# Patient Record
Sex: MALE | Race: BLACK OR AFRICAN AMERICAN | NOT HISPANIC OR LATINO
[De-identification: names, ages, dates, MRNs, and addresses within clinical notes are randomized per-mention and may not be internally consistent; named-entity substitution may affect disease eponyms.]

---
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
## 2023-12-22 PROBLEM — Z00.00 ENCOUNTER FOR PREVENTIVE HEALTH EXAMINATION: Status: ACTIVE | Noted: 2023-12-22

## 2024-01-02 ENCOUNTER — APPOINTMENT (OUTPATIENT)
Dept: ORTHOPEDIC SURGERY | Facility: CLINIC | Age: 19
End: 2024-01-02
Payer: COMMERCIAL

## 2024-01-02 VITALS — WEIGHT: 160 LBS | HEIGHT: 69 IN | BODY MASS INDEX: 23.7 KG/M2

## 2024-01-02 DIAGNOSIS — M70.71 OTHER BURSITIS OF HIP, RIGHT HIP: ICD-10-CM

## 2024-01-02 PROCEDURE — 72100 X-RAY EXAM L-S SPINE 2/3 VWS: CPT

## 2024-01-02 PROCEDURE — 99213 OFFICE O/P EST LOW 20 MIN: CPT

## 2024-01-02 RX ORDER — DICLOFENAC POTASSIUM 50 MG/1
50 TABLET, COATED ORAL TWICE DAILY
Qty: 14 | Refills: 1 | Status: ACTIVE | COMMUNITY
Start: 2024-01-02 | End: 1900-01-01

## 2024-01-02 NOTE — ASSESSMENT
[FreeTextEntry1] : While he does give a history of herniated disc 2 or 3 years back he does not seem to have any radicular symptoms at this point in time and his symptoms are relatively mild due to the fact that he starts lacrosse again in the beginning of February we will do an MRI scan to further evaluate the etiology of his pain and also keep him on he had the Cataflam anti-inflammatory medicine he will return in 1 week after the MRI is performed and consider local cortisone shot for the bursitis in the right hip.

## 2024-01-02 NOTE — HISTORY OF PRESENT ILLNESS
[Lower back] : lower back [7] : 7 [2] : 2 [Intermittent] : intermittent [Heat] : heat [Physical therapy] : physical therapy [Exercising] : exercising [Student] : Work status: student [] : Post Surgical Visit: no [FreeTextEntry1] : hips [FreeTextEntry5] : plays [employer] lacrosse. pain started 4-5 months ago. has done xray,mri, and PT since October. herniated disc 2 years ago. strained lower back muscle 1 year ago. tried chiropractor over the summer which temporarily helpe. [FreeTextEntry6] : numbing, weakness [FreeTextEntry7] : glute [FreeTextEntry9] : dry needling

## 2024-01-02 NOTE — REASON FOR VISIT
[FreeTextEntry2] : NP - lower back and hips Patient comes to office with 3 to 4 months of pain in the lower back and right hip area without any specific injury but secondary to his  in college.  Examination today reveals ambulates with a normal gait straight leg raising is negative DTRs are 2+ bilaterally has some mild tenderness in the lumbar paraspinals more so on the right than the left and also toward the sacroiliac joint on the right side right hip motion is grossly normal no gross swelling but does show some localized trochanteric bursitis.  Neurovascular status grossly intact both lower extremities he can walk on his toes and heels and he can flex and touch his toes.

## 2024-01-02 NOTE — DATA REVIEWED
[FreeTextEntry1] : X-ray of the lumbar spine performed today is consistent with some mild muscular spasm affecting the alignment of the spine.

## 2024-01-04 ENCOUNTER — APPOINTMENT (OUTPATIENT)
Dept: MRI IMAGING | Facility: CLINIC | Age: 19
End: 2024-01-04
Payer: COMMERCIAL

## 2024-01-04 PROCEDURE — 72148 MRI LUMBAR SPINE W/O DYE: CPT

## 2024-01-22 ENCOUNTER — APPOINTMENT (OUTPATIENT)
Dept: ORTHOPEDIC SURGERY | Facility: CLINIC | Age: 19
End: 2024-01-22
Payer: COMMERCIAL

## 2024-01-22 VITALS — WEIGHT: 160 LBS | HEIGHT: 69 IN | BODY MASS INDEX: 23.7 KG/M2

## 2024-01-22 DIAGNOSIS — S39.012A STRAIN OF MUSCLE, FASCIA AND TENDON OF LOWER BACK, INITIAL ENCOUNTER: ICD-10-CM

## 2024-01-22 PROCEDURE — 99213 OFFICE O/P EST LOW 20 MIN: CPT

## 2024-01-22 RX ORDER — DICLOFENAC POTASSIUM 50 MG/1
50 TABLET, COATED ORAL TWICE DAILY
Qty: 14 | Refills: 1 | Status: ACTIVE | COMMUNITY
Start: 2024-01-22 | End: 1900-01-01

## 2024-01-22 NOTE — ASSESSMENT
[FreeTextEntry1] : Patient is scheduled to go back up to [place] to [facility] this weekend.  He will attend a couple physical therapy sessions before he leaves today to learn some exercises for flexibility and core strength he will be given a prescription for Cataflam to take as necessary he will return whenever he is back in [place] for follow-up examination and may need to get an epidural shot this spring.

## 2024-01-22 NOTE — HISTORY OF PRESENT ILLNESS
[Lower back] : lower back [Gradual] : gradual [6] : 6 [Dull/Aching] : dull/aching [Localized] : localized [Intermittent] : intermittent [Rest] : rest [Sitting] : sitting [Student] : Work status: student [] : Post Surgical Visit: no [de-identified] : 1/2/2024 [de-identified] : Dr. Cortes  [de-identified] : MRI

## 2024-01-22 NOTE — REASON FOR VISIT
[FreeTextEntry2] : F/U MRI results  Patient returns to the office in follow-up regarding his lower back he recently returned from a trip to [place] and is scheduled to go back to school later this week.  His MRI showed some disc damage at L1-2 and L5-S1.  Examination today reveals ambulates with a normal gait straight leg raising is negative bilaterally DTRs are 2+ bilaterally has some mild tenderness in the left lumbar paraspinals sometimes rating into the left thigh neurovascular status grossly intact today he can walk on both his toes and heels he can flex and touch his toes with mild difficulty.